# Patient Record
Sex: FEMALE | Race: WHITE
[De-identification: names, ages, dates, MRNs, and addresses within clinical notes are randomized per-mention and may not be internally consistent; named-entity substitution may affect disease eponyms.]

---

## 2017-02-10 ENCOUNTER — HOSPITAL ENCOUNTER (OUTPATIENT)
Dept: HOSPITAL 59 - HOP | Age: 57
Discharge: HOME | End: 2017-02-10
Attending: INTERNAL MEDICINE
Payer: COMMERCIAL

## 2017-02-10 DIAGNOSIS — Z12.11: Primary | ICD-10-CM

## 2017-02-13 NOTE — OPERATIVE NOTE
DATE OF SURGERY: 02/10/2017



SURGEON: Steve Newman MD  



OPERATION: COLONOSCOPY. 



INDICATIONS: This is a 56-year-old female with average risk for colorectal cancer who presented for 
screening colonoscopy. 



POSTOPERATIVE DIAGNOSIS: Normal colon. 



ANESTHESIA: Sedation is per Anesthesia. Pulse oximetry was monitored throughout the procedure to 
maintain O2 saturation of 90% or greater. Supplemental oxygen was administered via nasal cannula. 
Cardiac and vital signs were monitored throughout the duration of the procedure, and they were 
stable. 



The procedure of colonoscopy and risks and alternatives of the procedure, including the risk of 
bleeding and perforation, among others, were explained to the patient who voiced understanding and 
agreed to have the procedure done. Physical examination was performed, and the patient was found 
stable for sedation. 



PROCEDURE: The patient was placed in the left lateral position. Sedation was initiated. A digital 
rectal exam was performed and showed some mild external hemorrhoids with no palpable rectal masses. 
An Olympus PCF-180AL colonoscope was then inserted into the rectum under direct visualization. It 
was advanced to the cecum without difficulty. The ileocecal valve and appendiceal orifice were 
identified and photographed. The colonic mucosa was carefully examined upon introduction of the 
colonoscope. There were no lesions noted. The colonoscope was then withdrawn while carefully 
examining the colonic mucosal surfaces. No lesions were noted. In the rectum, retroflexion was 
performed and grade 1 internal hemorrhoids were noted. The colonoscope was then withdrawn and the 
procedure was terminated. The patient tolerated the procedure well without any immediate 
complications. He remained with stable vital signs and was transferred to the recovery room. 



RECOMMENDATIONS: 

1. The patient should be on a high-fiber diet. 

2. The patient is to have a repeat colonoscopy for screening in 10 years.  



Thank you for allowing me to participate in the care of your patient. 





_________________________

Steve Newman MD



CC: Kamala FARMER

## 2018-05-13 ENCOUNTER — HOSPITAL ENCOUNTER (EMERGENCY)
Dept: HOSPITAL 59 - ER | Age: 58
Discharge: HOME | End: 2018-05-13
Payer: COMMERCIAL

## 2018-05-13 DIAGNOSIS — J10.1: Primary | ICD-10-CM

## 2018-05-13 LAB
ANION GAP SERPL CALC-SCNC: 17 MMOL/L (ref 7–16)
BASOPHILS NFR BLD: 0.5 % (ref 0–6)
BUN SERPL-MCNC: 10 MG/DL (ref 6–20)
CO2 SERPL-SCNC: 29 MMOL/L (ref 22–29)
CREAT SERPL-MCNC: 0.7 MG/DL (ref 0.5–0.9)
EOSINOPHIL NFR BLD: 0.5 % (ref 0–6)
ERYTHROCYTE [DISTWIDTH] IN BLOOD BY AUTOMATED COUNT: 12.5 % (ref 11.5–14.5)
EST GLOMERULAR FILTRATION RATE: > 60 ML/MIN
FLUBV AG SPEC QL IA: POSITIVE
GLUCOSE SERPL-MCNC: 119 MG/DL (ref 74–109)
GRANULOCYTES NFR BLD: 69.1 % (ref 47–80)
HCT VFR BLD CALC: 41.4 % (ref 35–47)
HGB BLD-MCNC: 13.9 GM/DL (ref 11.6–16)
LEAD BLD-MCNC: NEGATIVE UG/DL
LYMPHOCYTES NFR BLD AUTO: 17.5 % (ref 16–45)
MCH RBC QN AUTO: 30.2 PG (ref 27–33)
MCHC RBC AUTO-ENTMCNC: 33.6 G/DL (ref 32–36)
MCV RBC AUTO: 89.8 FL (ref 81–97)
MONOCYTES NFR BLD: 12.4 % (ref 0–9)
PLATELET # BLD: 221 K/UL (ref 130–400)
PMV BLD AUTO: 9.7 FL (ref 7.4–10.4)
RBC # BLD AUTO: 4.61 M/UL (ref 3.8–5.4)
WBC # BLD AUTO: 3.9 K/UL (ref 4.2–12.2)

## 2018-05-13 PROCEDURE — 87400 INFLUENZA A/B EACH AG IA: CPT

## 2018-05-13 PROCEDURE — 80048 BASIC METABOLIC PNL TOTAL CA: CPT

## 2018-05-13 PROCEDURE — 71046 X-RAY EXAM CHEST 2 VIEWS: CPT

## 2018-05-13 PROCEDURE — 85025 COMPLETE CBC W/AUTO DIFF WBC: CPT

## 2018-05-13 PROCEDURE — 99284 EMERGENCY DEPT VISIT MOD MDM: CPT

## 2018-05-13 NOTE — EMERGENCY DEPARTMENT RECORD
History of Present Illness





- General


Chief Complaint: Cough


Stated Complaint: COUGH,FEVER


Time Seen by Provider: 18 12:51


Source: Patient


Mode of Arrival: Ambulatory


Limitations: No limitations





- History of Present Illness


Initial Comments: 


56 yo female presents with about 4 days of progressive cough.  She has 

associated congestion and fevers.  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 

had similar symptoms.  No NVD.  She is not a smoker.  No shortness of breath.  

Her appetite is decreased.  No underlying history of lung disease.   PCP is the 

[facility]. 





MD Complaint: Cough, Fever, Nasal congestion


Onset/Timin


-: Days(s) (4)


Severity: Moderate


Quality: Aching


Consistency: Constant


Worsens With: Other (coughing)


Associated Symptoms: Cough


Treatments Prior to Arrival: None





- Related Data


 Home Medications











 Medication  Instructions  Recorded  Confirmed  Last Taken


 


Omeprazole [Prilosec] 1 tab PO DAILY 18








 Previous Rx's











 Medication  Instructions  Recorded


 


Oseltamivir Phosphate [Tamiflu] 75 mg PO BID #10 capsule 18











 Allergies











Allergy/AdvReac Type Severity Reaction Status Date / Time


 


No Known Drug Allergies Allergy   Verified 18 12:54














Travel Screening





- Travel/Exposure Within Last 30 Days


Have you traveled within the last 30 days?: No





- Travel/Exposure Within Last Year


Have you traveled outside the [place] in the last year?: No





- Additonal Travel Details


Have you been exposed to anyone with a communicable illness?: Yes


Exposure Details:: Mom bronchitis





- Travel Symptoms


Symptom Screening: Fever (Subjective)





Review of Systems


Constitutional: Reports: Fever, Weakness


Eyes: Denies: Eye discharge, Eye pain, Photophobia


ENT: Reports: Congestion.  Denies: Ear pain, Throat pain


Respiratory: Reports: Cough.  Denies: Dyspnea, Hemoptysis, Stridor, Wheezes


Cardiovascular: Denies: Chest pain, Palpitations, Syncope


Endocrine: Reports: Fatigue


Gastrointestinal: Denies: Abdominal pain, Diarrhea, Nausea, Vomiting


Genitourinary: Denies: Dysuria, Frequency, Hematuria, Urgency


Musculoskeletal: Denies: Arthralgia, Back pain, Myalgia, Neck pain


Skin: Denies: Bruising, Change in color, Rash


Neurological: Denies: Headache, Numbness, Weakness


Psychiatric: Denies: Anxiety


Hematological/Lymphatic: Denies: Easy bleeding, Easy bruising, Swollen glands





Past Medical History





- SOCIAL HISTORY


Smoking Status: Never smoker


Alcohol Use: Occasional


Drug Use: None





- RESPIRATORY


Hx Respiratory Disorders: No





- CARDIOVASCULAR


Hx Cardio Disorders: No





- NEURO


Hx Neuro Disorders: No





- GI


Hx GI Disorders: Yes


Hx Reflux: Yes





- 


Hx Genitourinary Disorders: Yes


Hx Kidney Stones: Yes





- ENDOCRINE


Hx Endocrine Disorders: No





- MUSCULOSKELETAL


Hx Musculoskeletal Disorders: No





- PSYCH


Hx Psych Problems: No





- HEMATOLOGY/ONCOLOGY


Hx Hematology/Oncology Disorders: Yes


Hx Anemia: Yes (iron deficiency)





Family Medical History


Any Significant Family History?: No


Hx Cancer: Father


Hx Heart Disease: Father





Physical Exam





- General


General Appearance: Alert, Oriented x3, Cooperative, No acute distress


Limitations: No limitations





- Head


Head exam: Atraumatic, Normal inspection





- Eye


Eye exam: Normal appearance.  negative: Conjunctival injection, Scleral icterus





- ENT


ENT exam: Normal exam, Mucous membranes moist


Ear exam: Normal external inspection


Nasal Exam: Normal inspection


Mouth exam: Normal external inspection


Teeth exam: Normal inspection


Throat exam: Normal inspection.  negative: Tonsillar erythema, Tonsillomegaly, 

Tonsillar exudate, R peritonsillar mass, L peritonsillar mass





- Neck


Neck exam: Normal inspection.  negative: Lymphadenopathy





- Respiratory


Respiratory exam: Normal lung sounds bilaterally.  negative: Respiratory 

distress, Rhonchi, Stridor, Wheezes





- Cardiovascular


Cardiovascular Exam: Tachycardia





- Rectal


Rectal exam: Deferred





- 


 exam: Deferred





- Extremities


Extremities exam: Normal inspection, Full ROM.  negative: Pedal edema





- Back


Back exam: Reports: Normal inspection, Full ROM.  Denies: Muscle spasm, Rash 

noted, Tenderness





- Neurological


Neurological exam: Alert, Oriented X3





- Psychiatric


Psychiatric exam: Normal affect, Normal mood





- Skin


Skin exam: Dry, Intact, Normal color, Warm





Course





 Vital Signs











  18





  12:45


 


Temperature 100.2 F H


 


Pulse Rate 119 H


 


Respiratory 18





Rate 


 


Blood Pressure 138/95


 


Pulse Ox 97














- Reevaluation(s)


Reevaluation #1: 





18 12:55


Vitals reviewed.  HR mildly elevated.  Labs and IV ordered.


18 13:50


The labs were reviewed


She is influenza B Positive


18 13:57


The CXR was reviewed and is normal





Medical Decision Making





- Lab Data


Result diagrams: 


 18 13:08





 18 13:08





Disposition


Disposition: Discharge


Clinical Impression: 


 Influenza B





Disposition: Home, Self-Care


Condition: (1) Good


Instructions:  Influenza (ED)


Additional Instructions: 


Stay well hydrated


You may take Tylenol or Motrin for pain


You can be contagious so wash hands and cover up with coughing


Prescriptions: 


Oseltamivir Phosphate [Tamiflu] 75 mg PO BID #10 capsule


Forms:  Patient Portal Access


Time of Disposition: 13:57





Quality





- Quality Measures


Quality Measures: N/A





- Blood Pressure Screening


Does Patient Have Any of the Following: No


Blood Pressure Classification: Hypertensive Reading


Systolic Measurement: 138


Diastolic Measurement: 95


Screening for High Blood Pressure: < Pre-Hypertensive BP, F/U Documented > [

]


Pre-Hypertensive Follow-up Interventions: Referral to alternative/primary care 

provider.

## 2018-05-14 NOTE — RADIOLOGY REPORT
EXAM:  CHEST, TWO VIEWS



HISTORY:  PATIENT HAS COUGH AND FEVER.  



TECHNIQUE:  Two views of the chest are provided without comparison 
examinations.  



FINDINGS:  The cardiomediastinal silhouette is within normal limits for size 
and contour.  The edinson appear unremarkable.  There is no radiographic evidence 
of a focal infiltrate or pleural effusion.  



IMPRESSION:  

NO RADIOGRAPHIC EVIDENCE OF AN ACUTE INTRATHORACIC PROCESS.  



JOB NUMBER:  [id number]
MTDD

## 2018-08-28 ENCOUNTER — HOSPITAL ENCOUNTER (OUTPATIENT)
Dept: HOSPITAL 59 - SUR | Age: 58
Discharge: HOME | End: 2018-08-28
Attending: UROLOGY
Payer: COMMERCIAL

## 2018-08-28 DIAGNOSIS — K21.9: ICD-10-CM

## 2018-08-28 DIAGNOSIS — N39.3: Primary | ICD-10-CM

## 2018-08-28 PROCEDURE — 00860 ANES XTRPRTL PX LWR ABD NOS: CPT

## 2018-08-28 PROCEDURE — 57288 REPAIR BLADDER DEFECT: CPT

## 2018-08-29 NOTE — OPERATIVE NOTE
DATE OF SURGERY: 08/28/2018 



PREOPERATIVE DIAGNOSIS: Stress urinary incontinence. 



POSTOPERATIVE DIAGNOSIS: Stress urinary incontinence. 



OPERATION: Transobturator mid urethral sling with Greg. 



Surgeon: Taco Lobo MD 



Assistant: None. 



Anesthesia: General. 



COMPLICATIONS: No intraoperative complications noted. 



PROCEDURE: Preop informed consent was obtained. Antibiotics were given. Sedation was administered. 
The patient was brought to the operating room and given general anesthetic and carefully placed in 
lithotomy position with genitalia clipped, prepped and draped in the usual sterile fashion. Lora 
catheter was placed. The bladder was completely emptied and a weighted speculum was placed in the 
vagina. The labia were  with 2-0 silk suture ligatures. The mid urethra was now very well 
exposed. The bladder neck and meatus were demarcated and the vaginal mucosa was infiltrated with 
lidocaine with epinephrine. A mid urethral incision was made in the vaginal mucosa and the vaginal 
flaps were then carefully developed dissecting laterally and posteriorly towards the pubic rami. 
Once this was done, I could palpate through the incision up to the pubic rami on both sides, and 
adequate space was developed for placement of the sling. The sling had been soaking on the back 
table and was an Greg sling manufactured by [employer]. The transobturator needles were then placed 
in a standard fashion after making a small stab incision in the thigh on both sides using the usual 
anatomic landmarks. Each transobturator needle was placed without difficulty and the sling was then 
threaded through the needles and the needles were then backed out brining the sling up to the 
undersurface of the mid urethra without any twisting and was in excellent position. The area was 
then irrigated out multiple times and a curved Payan scissors was kept between the urethra and the 
sling to prevent undue tension on the urethra. Cystoscopy was then performed. The bladder was 
inspected systematically. There was no evidence of any bladder perforation or injury and both 
ureteral orifices were effluxing clear urine rapidly. The scope was then withdrawn. Lora catheter 
was replaced and again the sling was examined and found to be in excellent position without any 
undue tension on the urethra. The tapes were then cut flush with the skin at each of the thigh 
incisions. The vaginal incision was then closed with running 2-0 Vicryl. The thigh incisions were 
closed with Dermabond compound and a small vaginal packing was placed. The bladder was filled with 
approximately 150 mL of sterile saline and the catheter was removed. The patient was awakened and 
transferred to recovery in stable condition. 



PLAN: The patient will have a trial void in the recovery room. Will be discharged if she is voiding 
adequately. Follow up in 1 week for review of her symptoms and postvoid residual in the urology 
clinic. Sponge, needle, and instrument counts were correct at the end of the case. Blood loss was 
minimal. There were no intraoperative complications noted. CC: MD MARLENY Ford